# Patient Record
Sex: MALE | Race: WHITE | NOT HISPANIC OR LATINO
[De-identification: names, ages, dates, MRNs, and addresses within clinical notes are randomized per-mention and may not be internally consistent; named-entity substitution may affect disease eponyms.]

---

## 2024-05-16 ENCOUNTER — APPOINTMENT (OUTPATIENT)
Dept: PULMONOLOGY | Facility: CLINIC | Age: 34
End: 2024-05-16

## 2024-07-16 ENCOUNTER — APPOINTMENT (OUTPATIENT)
Dept: PULMONOLOGY | Facility: CLINIC | Age: 34
End: 2024-07-16
Payer: COMMERCIAL

## 2024-07-16 VITALS
WEIGHT: 190 LBS | OXYGEN SATURATION: 96 % | HEART RATE: 88 BPM | SYSTOLIC BLOOD PRESSURE: 109 MMHG | DIASTOLIC BLOOD PRESSURE: 76 MMHG | BODY MASS INDEX: 26.9 KG/M2 | HEIGHT: 70.5 IN

## 2024-07-16 DIAGNOSIS — G47.33 OBSTRUCTIVE SLEEP APNEA (ADULT) (PEDIATRIC): ICD-10-CM

## 2024-07-16 PROCEDURE — 99203 OFFICE O/P NEW LOW 30 MIN: CPT

## 2024-08-02 ENCOUNTER — APPOINTMENT (OUTPATIENT)
Dept: PULMONOLOGY | Facility: CLINIC | Age: 34
End: 2024-08-02
Payer: COMMERCIAL

## 2024-08-03 PROCEDURE — 95800 SLP STDY UNATTENDED: CPT | Mod: 52

## 2024-08-04 PROCEDURE — 95800 SLP STDY UNATTENDED: CPT

## 2024-08-05 PROCEDURE — 95800 SLP STDY UNATTENDED: CPT | Mod: 52

## 2024-08-14 ENCOUNTER — TRANSCRIPTION ENCOUNTER (OUTPATIENT)
Age: 34
End: 2024-08-14

## 2024-08-19 ENCOUNTER — APPOINTMENT (OUTPATIENT)
Dept: PULMONOLOGY | Facility: CLINIC | Age: 34
End: 2024-08-19
Payer: COMMERCIAL

## 2024-08-19 VITALS — OXYGEN SATURATION: 97 % | DIASTOLIC BLOOD PRESSURE: 84 MMHG | SYSTOLIC BLOOD PRESSURE: 123 MMHG | HEART RATE: 91 BPM

## 2024-08-19 DIAGNOSIS — G47.33 OBSTRUCTIVE SLEEP APNEA (ADULT) (PEDIATRIC): ICD-10-CM

## 2024-08-19 PROCEDURE — 99213 OFFICE O/P EST LOW 20 MIN: CPT

## 2024-08-19 NOTE — HISTORY OF PRESENT ILLNESS
[TextBox_4] : Followup for sleep apnea Patient here to review results of home sleep study. No change in history as reported on initial evaluation

## 2024-08-19 NOTE — ASSESSMENT
[FreeTextEntry1] : Results of sleep study reviewed with patient. Treatment options including weight loss oral appliance therapy and PAP therapy were reviewed with patient. After careful review of sleep study patient desire and risk factors recommend initial therapy with PAP. Will order auto titrating device. Telehealth visit 1 week after receiving device

## 2024-09-30 ENCOUNTER — APPOINTMENT (OUTPATIENT)
Dept: PULMONOLOGY | Facility: CLINIC | Age: 34
End: 2024-09-30
Payer: COMMERCIAL

## 2024-09-30 DIAGNOSIS — G47.33 OBSTRUCTIVE SLEEP APNEA (ADULT) (PEDIATRIC): ICD-10-CM

## 2024-09-30 PROCEDURE — 99213 OFFICE O/P EST LOW 20 MIN: CPT

## 2024-09-30 NOTE — REASON FOR VISIT
[Home] : at home, [unfilled] , at the time of the visit. [Medical Office: ([facility])___] : at the medical office located in

## 2024-09-30 NOTE — ASSESSMENT
[FreeTextEntry1] : Not succeeding with CPAP headache after CPAP use suggest maybe he has central apnea. Have lowered CPAP to 6 cm H2O pressure and asked patient to good resume usage If continues to have same symptoms will order laboratory polysomnography If able to tolerate will monitor and adjust pressures accordingly